# Patient Record
Sex: MALE | Race: BLACK OR AFRICAN AMERICAN | Employment: FULL TIME | URBAN - METROPOLITAN AREA
[De-identification: names, ages, dates, MRNs, and addresses within clinical notes are randomized per-mention and may not be internally consistent; named-entity substitution may affect disease eponyms.]

---

## 2024-02-12 ENCOUNTER — HOSPITAL ENCOUNTER (EMERGENCY)
Facility: HOSPITAL | Age: 24
Discharge: HOME | End: 2024-02-12
Attending: EMERGENCY MEDICINE
Payer: MEDICAID

## 2024-02-12 VITALS
RESPIRATION RATE: 18 BRPM | HEART RATE: 90 BPM | DIASTOLIC BLOOD PRESSURE: 81 MMHG | OXYGEN SATURATION: 98 % | TEMPERATURE: 99 F | WEIGHT: 250 LBS | SYSTOLIC BLOOD PRESSURE: 142 MMHG

## 2024-02-12 DIAGNOSIS — A54.9 GONORRHEA: ICD-10-CM

## 2024-02-12 DIAGNOSIS — N34.2 URETHRITIS: Primary | ICD-10-CM

## 2024-02-12 LAB
BILIRUB UR QL: NEGATIVE
GLUCOSE UR-MCNC: NORMAL MG/DL
HGB UR QL STRIP.AUTO: NEGATIVE
KETONES UR-MCNC: NEGATIVE MG/DL
LEUKOCYTE ESTERASE UR QL STRIP.AUTO: 250
NITRITE UR QL STRIP.AUTO: NEGATIVE
PH UR: 6.5 [PH] (ref 5–8)
PROT UR-MCNC: NEGATIVE MG/DL
SP GR UR STRIP: 1.02 (ref 1–1.03)
T PALLIDUM AB SER QL IA: NONREACTIVE
UROBILINOGEN UR STRIP-ACNC: NORMAL
WBC #/AREA URNS AUTO: >50 /HPF

## 2024-02-12 PROCEDURE — 87591 N.GONORRHOEAE DNA AMP PROB: CPT | Performed by: EMERGENCY MEDICINE

## 2024-02-12 PROCEDURE — 99284 EMERGENCY DEPT VISIT MOD MDM: CPT

## 2024-02-12 PROCEDURE — 36415 COLL VENOUS BLD VENIPUNCTURE: CPT

## 2024-02-12 PROCEDURE — 96372 THER/PROPH/DIAG INJ SC/IM: CPT

## 2024-02-12 PROCEDURE — 81001 URINALYSIS AUTO W/SCOPE: CPT | Performed by: EMERGENCY MEDICINE

## 2024-02-12 PROCEDURE — 99283 EMERGENCY DEPT VISIT LOW MDM: CPT

## 2024-02-12 PROCEDURE — 87491 CHLMYD TRACH DNA AMP PROBE: CPT | Performed by: EMERGENCY MEDICINE

## 2024-02-12 PROCEDURE — 86780 TREPONEMA PALLIDUM: CPT | Performed by: EMERGENCY MEDICINE

## 2024-02-12 RX ORDER — DOXYCYCLINE HYCLATE 100 MG/1
100 CAPSULE ORAL 2 TIMES DAILY
Qty: 14 CAPSULE | Refills: 0 | Status: SHIPPED | OUTPATIENT
Start: 2024-02-12 | End: 2024-02-19

## 2024-02-12 NOTE — ED INITIAL ASSESSMENT (HPI)
Patient complains of dysuria and discharge since Thursday, had unprotected sex on Monday, would like to be screened for STDs

## 2024-02-12 NOTE — DISCHARGE INSTRUCTIONS
Take the Doxy doxycycline as directed.  Please follow-up with either your primary doctor or the recommended doctor or the health department for additional testing.  Your test for gonorrhea and chlamydia is still pending but you have been treated for both as long as you take the doxycycline.  Read all instructions.  Return to the ER for changes or worsening.  Avoid intercourse for 2 weeks.  You should tell all of your partners about your suspected diagnosis.

## 2024-02-12 NOTE — ED PROVIDER NOTES
Patient Seen in: [facility] Emergency Department      History     Chief Complaint   Patient presents with    Eval-G     Stated Complaint: STD testing    Subjective:   23-year-old male who did have unprotected intercourse about 8 days ago with someone here with 3 days of burning when he urinates and some whitish discharge.  He has no sores or lesions.  No fever or chills.  No swelling in his groin.  No nausea vomiting or diarrhea.  No cough or congestion.  No history of STD.            Objective:   History reviewed. No pertinent past medical history.           History reviewed. No pertinent surgical history.             Social History     Socioeconomic History    Marital status: Single   Tobacco Use    Smoking status: Never    Smokeless tobacco: Never              Review of Systems    Positive for stated complaint: STD testing  Other systems are as noted in HPI.  Constitutional and vital signs reviewed.      All other systems reviewed and negative except as noted above.    Physical Exam     ED Triage Vitals [02/12/24 1208]   BP (!) 148/97   Pulse 93   Resp 18   Temp 98.5 °F (36.9 °C)   Temp src Temporal   SpO2 98 %   O2 Device None (Room air)       Current:/81   Pulse 90   Temp 98.5 °F (36.9 °C) (Temporal)   Resp 18   Wt 113.4 kg   SpO2 98%         Physical Exam  HENT:      Head: Normocephalic.      Right Ear: External ear normal.      Left Ear: External ear normal.      Nose: Nose normal.      Mouth/Throat:      Mouth: Mucous membranes are dry.   Eyes:      Extraocular Movements: Extraocular movements intact.   Cardiovascular:      Rate and Rhythm: Normal rate.      Pulses: Normal pulses.   Pulmonary:      Breath sounds: Normal breath sounds.   Abdominal:      Palpations: Abdomen is soft.   Genitourinary:     Testes: Normal.      Comments: No lesions on the penis but there is some whitish discharge at the meatus  Musculoskeletal:      Cervical back: Normal range of motion.   Skin:     General:  Skin is warm.      Capillary Refill: Capillary refill takes less than 2 seconds.   Neurological:      Mental Status: He is alert.      Sensory: No sensory deficit.      Motor: No weakness.               ED Course     Labs Reviewed   URINALYSIS, ROUTINE - Abnormal; Notable for the following components:       Result Value    Clarity Urine Turbid (*)     Leukocyte Esterase Urine 250 (*)     WBC Urine >50 (*)     All other components within normal limits   CHLAMYDIA/GONOCOCCUS, BRENDAN - Abnormal; Notable for the following components:    Neisseria Gonorrhoeae Amplified RNA Positive (*)     All other components within normal limits   T PALLIDUM SCREENING CASCADE - Normal          ED Course as of 02/14/24 1637  ------------------------------------------------------------  Time: 02/12 1637  Comment: Labs independently interpreted by me.  RPR negative.  Chlamydia gonorrhea pending.  I am not concerned about the urine as he had discharge and likely has pyuria.  ------------------------------------------------------------  Time: 02/14 1636  Comment: Pcr shows gonorrhea.  Pt was iven ceftriaxone in ED              MDM                                         Medical Decision Making  Pt with dysuria and discharge with a new partner 8 days prior,  diff is urethritis uti other sti    Likely gonorhea or chlamydia,   will treat and test    Amount and/or Complexity of Data Reviewed  Labs: ordered. Decision-making details documented in ED Course.  Discussion of management or test interpretation with external provider(s): Good instructions given,  ceftriaxone given, follow up options iven    Risk  Prescription drug management.        Disposition and Plan     Clinical Impression:  1. Urethritis    2. Gonorrhea         Disposition:  Discharge  2/12/2024  4:37 pm    Follow-up:  [facility]  [address] [facility] [id number]  Follow up      Juan Raymundo MD  [address] [place] [address]  [id number]  [phone]    Follow up            Medications Prescribed:  Discharge Medication List as of 2/12/2024  4:41 PM        START taking these medications    Details   doxycycline 100 MG Oral Cap Take 1 capsule (100 mg total) by mouth 2 (two) times daily for 7 days., Normal, Disp-14 capsule, R-0

## 2024-02-13 LAB
C TRACH DNA SPEC QL NAA+PROBE: NEGATIVE
N GONORRHOEA DNA SPEC QL NAA+PROBE: POSITIVE

## 2024-02-13 NOTE — PROGRESS NOTES
ED Culture Callback Results Review    Pharmacist reviewed culture results from ED visit .    Final urine culture positive for Gonorrhea. Patient appropriately treated with Rocephin in ED  Current therapy is appropriate based on reported susceptibilities. No further intervention required at this time.        Sal Manzo PharmD  Emergency Medicine Pharmacist Specialist  02/13/24; 2:07 PM